# Patient Record
Sex: MALE | Race: WHITE
[De-identification: names, ages, dates, MRNs, and addresses within clinical notes are randomized per-mention and may not be internally consistent; named-entity substitution may affect disease eponyms.]

---

## 2017-06-03 ENCOUNTER — HOSPITAL ENCOUNTER (EMERGENCY)
Dept: HOSPITAL 25 - ED | Age: 47
Discharge: HOME | End: 2017-06-03
Payer: COMMERCIAL

## 2017-06-03 VITALS — SYSTOLIC BLOOD PRESSURE: 134 MMHG | DIASTOLIC BLOOD PRESSURE: 82 MMHG

## 2017-06-03 DIAGNOSIS — M25.552: Primary | ICD-10-CM

## 2017-06-03 DIAGNOSIS — G43.909: ICD-10-CM

## 2017-06-03 DIAGNOSIS — F41.9: ICD-10-CM

## 2017-06-03 DIAGNOSIS — J44.9: ICD-10-CM

## 2017-06-03 PROCEDURE — 99282 EMERGENCY DEPT VISIT SF MDM: CPT

## 2017-06-03 NOTE — RAD
Indication: Left hip pain.



2 views of left hip and an AP view the pelvis demonstrates no fracture. Joint spaces all

well-preserved. Pedicles appear intact.



IMPRESSION: No fracture of the left hip is noted.
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
## 2019-07-03 ENCOUNTER — HOSPITAL ENCOUNTER (EMERGENCY)
Dept: HOSPITAL 25 - ED | Age: 49
LOS: 1 days | Discharge: HOME | End: 2019-07-04
Payer: COMMERCIAL

## 2019-07-03 DIAGNOSIS — F41.9: ICD-10-CM

## 2019-07-03 DIAGNOSIS — M25.562: Primary | ICD-10-CM

## 2019-07-03 DIAGNOSIS — R20.0: ICD-10-CM

## 2019-07-03 PROCEDURE — 99282 EMERGENCY DEPT VISIT SF MDM: CPT

## 2019-07-03 NOTE — ED
Lower Extremity





- HPI Summary


HPI Summary: 


A 47 y/o male presents to [facility] with a chief complaint of left knee pain since 

rolling over after having sex tonight. At triage he rated his pain as a 3/10 in 

severity. He also reports swelling of the left knee and states that he has 

numbness in his legs. He notes that he has a lot of back problems and is seeing 

the neurosurgeon later this month.








- History of Current Complaint


Chief Complaint: EDExtremityLower


Stated Complaint: HURT LEFT KNEE PER PT


Time Seen by Provider: 07/03/19 23:42


Hx Obtained From: Patient


Mechanism Of Injury: Other - after rolling over


Onset of Pain: Minutes, Prior to Arrival


Onset/Duration: Minutes


Severity Initially: Mild


Severity Currently: Mild


Pain Intensity: 3


Pain Scale Used: 0-10 Numeric


Timing: Constant


Location: Is Discrete @ - left knee


Character Of Pain: Unable To Describe


Associated Signs And Symptoms: Positive: Swelling.  Negative: Fever


Aggravating Factor(s): Nothing


Alleviating Factor(s): Nothing





- Allergies/Home Medications


Allergies/Adverse Reactions: 


 Allergies











Allergy/AdvReac Type Severity Reaction Status Date / Time


 


No Known Allergies Allergy   Verified 08/09/17 08:56














PMH/Surg Hx/FS Hx/Imm Hx


Endocrine/Hematology History: 


   Denies: Hx Anticoagulant Therapy, Hx Diabetes, Hx Thyroid Disease


Cardiovascular History: 


   Denies: Hx Hypertension, Hx Pacemaker/ICD, Hx Peripheral Vascular Disease


Respiratory History: Reports: Hx Chronic Obstructive Pulmonary Disease (COPD) - 

currently being worked up for diagnosis


   Denies: Hx Asthma


GI History: Reports: Hx Ulcer - peptic ulcers as a teen


 History: 


   Denies: Hx Renal Disease


Sensory History: 


   Denies: Hx Contacts or Glasses, Hx Hearing Aid


Opthamlomology History: 


   Denies: Hx Contacts or Glasses


Neurological History: Reports: Hx Headaches, Hx Migraine - 1 in past 4-5 months

; more last year


   Denies: Hx Dementia, Hx Nerve Disease, Hx Seizures, Other Neuro Impairments/

Disorders


Psychiatric History: Reports: Hx Anxiety - takes Buspar, Hx Substance Abuse


   Denies: Hx Depression, Hx Panic Disorder





- Surgical History


Surgery Procedure, Year, and Place: 1989 [place] knee surgery Dr. Pinzon;.  1991 RT 

KNEE SURGERY;.  2009/2010 LT knee surgery Dr. Silverio;


Hx Anesthesia Reactions: No


Infectious Disease History: No


Infectious Disease History: 


   Denies: Hx Hepatitis, Hx Human Immunodeficiency Virus (HIV), Traveled 

Outside the [place] in Last 30 Days





- Family History


Known Family History: 


   Negative: Blood Disorder





- Social History


Alcohol Use: None


Substance Use Type: Reports: None


Smoking Status (MU): Never Smoked Tobacco





Review of Systems


Negative: Fever


Positive: Arthralgia - left knee pain, Other - positive: left knee swelling


Positive: Numbness


All Other Systems Reviewed And Are Negative: Yes





Physical Exam





- Summary


Physical Exam Summary: 





Appearance: Well-appearing, Well-nourished, lying in bed comfortable


Skin: Warm, dry, no obvious rash


Eyes: sclera anicteric, no conjunctival pallor


ENT: mucous membranes moist


Neck: deferred


Respiratory: No signs of respiratory distress


Cardiovascular: Appears well perfused, pulses are nml


Abdomen: deferred


Musculoskeletal: For left knee there is no obvious swelling or effusion, 

tenderness medial side of knee, Pt has full ROM, no stepoff of quadriceps or 

patellar tendon


Neurological: Awake and alert, mentation is normal, speech is fluent and 

appropriate


Psychiatric: affect is normal, does not appear anxious or depressed








Triage Information Reviewed: Yes


Vital Signs On Initial Exam: 


 Initial Vitals











Temp Pulse Resp BP Pulse Ox


 


 99.5 F   75   18   134/83   98 


 


 07/03/19 23:35  07/03/19 23:35  07/03/19 23:35  07/03/19 23:35  07/03/19 23:35











Vital Signs Reviewed: Yes





Diagnostics





- Vital Signs


 Vital Signs











  Temp Pulse Resp BP Pulse Ox


 


 07/03/19 23:35  99.5 F  75  18  134/83  98














- Laboratory


Lab Statement: Any lab studies that have been ordered have been reviewed, and 

results considered in the medical decision making process.





- Radiology


  ** knee x-ray


Radiology Interpretation Completed By: ED Physician


Summary of Radiographic Findings: no fracture. Pending official [facility] report.





Lower Extremity Course/Dx





- Course


Course Of Treatment: A 47 y/o male presents to [facility] with a chief complaint of 

left knee pain since rolling over after having sex tonight. The physical exam 

revealed that for the left knee there is no obvious swelling or effusion, 

tenderness medial side of knee, Pt has full ROM, no stepoff of quadriceps or 

patellar tendon. Knee x-ray showed no fracture. The patient will be discharged 

home and follow up with Dr. Corey, othopedic surgeon, if his pain is not 

improved. The patient is agreeable with this plan.





- Diagnoses


Provider Diagnoses: 


 Knee pain








Discharge





- Sign-Out/Discharge


Documenting (check all that apply): Patient Departure - DC


Patient Received Moderate/Deep Sedation with Procedure: No





- Discharge Plan


Condition: Good


Disposition: HOME


Patient Education Materials:  Knee Pain (ED)


Referrals: 


Edilberto Corey MD [Medical Doctor] - 


Additional Instructions: 


Your x-rays did not show a fracture or injury to the bone. For now rest the 

knee and use OTC pain medications. If it is not getting better by Monday 

contact the orthopedic surgeon for followup.





- Billing Disposition and Condition


Condition: GOOD


Disposition: Home





- Attestation Statements


Document Initiated by Scribe: Yes


Documenting Scribe: Kai Damon


Provider For Whom Deidre is Documenting (Include Credential): Drew Canela MD


Scribe Attestation: 


Kai LANGLEY scribed for Drew Canela MD on 07/04/19 at 0158. 


Scribe Documentation Reviewed: Yes


Provider Attestation: 


The documentation as recorded by the Kai osorio accurately 

reflects the service I personally performed and the decisions made by Drew benavidez MD


Status of Scribe Document: Viewed

## 2019-07-04 VITALS — SYSTOLIC BLOOD PRESSURE: 120 MMHG | DIASTOLIC BLOOD PRESSURE: 77 MMHG
